# Patient Record
Sex: FEMALE | HISPANIC OR LATINO
[De-identification: names, ages, dates, MRNs, and addresses within clinical notes are randomized per-mention and may not be internally consistent; named-entity substitution may affect disease eponyms.]

---

## 2020-10-08 PROBLEM — Z00.00 ENCOUNTER FOR PREVENTIVE HEALTH EXAMINATION: Status: ACTIVE | Noted: 2020-10-08

## 2020-11-02 ENCOUNTER — APPOINTMENT (OUTPATIENT)
Dept: COLORECTAL SURGERY | Facility: CLINIC | Age: 60
End: 2020-11-02
Payer: MEDICARE

## 2020-11-02 VITALS
TEMPERATURE: 98.2 F | WEIGHT: 131 LBS | HEIGHT: 60 IN | BODY MASS INDEX: 25.72 KG/M2 | DIASTOLIC BLOOD PRESSURE: 80 MMHG | SYSTOLIC BLOOD PRESSURE: 114 MMHG

## 2020-11-02 DIAGNOSIS — Z87.898 PERSONAL HISTORY OF OTHER SPECIFIED CONDITIONS: ICD-10-CM

## 2020-11-02 DIAGNOSIS — Z80.3 FAMILY HISTORY OF MALIGNANT NEOPLASM OF BREAST: ICD-10-CM

## 2020-11-02 DIAGNOSIS — B20 HUMAN IMMUNODEFICIENCY VIRUS [HIV] DISEASE: ICD-10-CM

## 2020-11-02 DIAGNOSIS — E03.9 HYPOTHYROIDISM, UNSPECIFIED: ICD-10-CM

## 2020-11-02 DIAGNOSIS — M19.90 UNSPECIFIED OSTEOARTHRITIS, UNSPECIFIED SITE: ICD-10-CM

## 2020-11-02 DIAGNOSIS — Z78.9 OTHER SPECIFIED HEALTH STATUS: ICD-10-CM

## 2020-11-02 PROCEDURE — 46600 DIAGNOSTIC ANOSCOPY SPX: CPT

## 2020-11-02 PROCEDURE — 99072 ADDL SUPL MATRL&STAF TM PHE: CPT

## 2020-11-02 PROCEDURE — 99202 OFFICE O/P NEW SF 15 MIN: CPT | Mod: 25

## 2020-11-02 RX ORDER — CONJUGATED ESTROGENS AND MEDROXYPROGESTERONE ACETATE .625; 5 MG/1; MG/1
0.625-5 TABLET, SUGAR COATED ORAL
Qty: 28 | Refills: 0 | Status: ACTIVE | COMMUNITY
Start: 2020-10-12

## 2020-11-02 RX ORDER — NAPROXEN 500 MG/1
500 TABLET ORAL
Qty: 40 | Refills: 0 | Status: ACTIVE | COMMUNITY
Start: 2020-08-08

## 2020-11-02 RX ORDER — ELVITEGRAVIR, COBICISTAT, EMTRICITABINE, AND TENOFOVIR ALAFENAMIDE 150; 150; 200; 10 MG/1; MG/1; MG/1; MG/1
TABLET ORAL
Refills: 0 | Status: ACTIVE | COMMUNITY

## 2020-11-02 RX ORDER — LEVOTHYROXINE SODIUM 0.07 MG/1
75 TABLET ORAL
Refills: 0 | Status: ACTIVE | COMMUNITY

## 2020-11-02 NOTE — PHYSICAL EXAM
[Excoriation] : no perianal excoriation [Wart] : no warts [Normal] : was normal [None] : there was no rectal mass  [de-identified] : Anal pap performed [FreeTextEntry1] : A lighted anoscope was passed into the anal canal and the entire anal mucosal surface was inspected..  The findings revealed moderate internal hemorrhoids. No masses or lesions were identified.\par \par

## 2020-11-02 NOTE — HISTORY OF PRESENT ILLNESS
[FreeTextEntry1] : 61 yo F presents for evaluation of abnormal anal pap, referred by PCP Dr. Surendra Pichardo\par \par Atypical anal pap 7/22/20, cervical pap negative, (+) HPV HR non 16/18 \par Noticed labial warts a few months ago when she shaved and cut the area. Otherwise denies irritation. Has not see GYN or DERM\par Denies anorectal pain, irritation\par BH: once daily w/ straining and occasional scant BRB noted on TP\par Reports adequate dietary fiber and water intake\par Hx of taking stool softener w/o improvement. Denies fiber supplement use\par \par PMH HIV, diagnosed approx 5 years ago (exposed by prior partner), on Genvoya, VL undetectable, CD4 524 (7/2020)\par EGD/Colonoscopy within the last 10 years, normal as per patient\par Denies ASA/NSAID use

## 2020-11-04 LAB — ANAL PAP CYTOLOGY: NORMAL

## 2021-02-12 ENCOUNTER — APPOINTMENT (OUTPATIENT)
Dept: COLORECTAL SURGERY | Facility: CLINIC | Age: 61
End: 2021-02-12
Payer: MEDICARE

## 2021-02-12 VITALS
WEIGHT: 133 LBS | TEMPERATURE: 96.3 F | HEART RATE: 84 BPM | BODY MASS INDEX: 26.11 KG/M2 | HEIGHT: 60 IN | SYSTOLIC BLOOD PRESSURE: 121 MMHG | DIASTOLIC BLOOD PRESSURE: 80 MMHG

## 2021-02-12 DIAGNOSIS — R85.619 UNSPECIFIED ABNORMAL CYTOLOGICAL FINDINGS IN SPECIMENS FROM ANUS: ICD-10-CM

## 2021-02-12 PROCEDURE — 99214 OFFICE O/P EST MOD 30 MIN: CPT | Mod: 25

## 2021-02-12 PROCEDURE — 46600 DIAGNOSTIC ANOSCOPY SPX: CPT

## 2021-02-12 PROCEDURE — 99072 ADDL SUPL MATRL&STAF TM PHE: CPT

## 2021-02-12 NOTE — ASSESSMENT
[FreeTextEntry1] : I have reviewed with the patient the clinical and natural history of human papilloma virus and its relationship to the anus. The risks and associated consequences including sexual transmission, anal warts, anal dysplasia, and the risk of anal cancer have been outlined. The need for long-term surveillance and followup has been detailed. The treatment options including high resolution anoscopy and its associated risk of recurrence and post procedure stricture and pain compared to continued close surveillance and monitoring were reviewed. 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  may require HRA.\par

## 2021-02-12 NOTE — PHYSICAL EXAM
[Excoriation] : no perianal excoriation [Wart] : no warts [Normal] : was normal [None] : there was no rectal mass  [de-identified] : Anal pap performed [FreeTextEntry1] : A lighted anoscope was passed into the anal canal and the entire anal mucosal surface was inspected..  The findings revealed moderate internal hemorrhoids. No masses or lesions were identified.\par \par

## 2021-02-12 NOTE — HISTORY OF PRESENT ILLNESS
[FreeTextEntry1] : 59 yo F presents for f/u anal dysplasia\par Last seen in the office on 11/2/20. Moderate internal hemorrhoids noted, exam otherwise normal.  Anal pap smear was LGSIL cannot r/o HGSIL\par Last cervical pap smear completed 7/2020 negative. H/o labia warts \par Denies rectal pain or bleeding\par \par BH: once daily w/ some straining\par Reports adequate dietary fiber and trying to increase water intake\par Takes Metamucil occasionally\par hx of anal receptive sex many years ago\par Has not received Gardasil vaccine\par HIV (+) on Genvoya. CD4-7/2020 , VL- undetectable (7/2020), has f/u w/ PCP next week\par Last colonoscopy completed within the last 10 years, no abnormal findings per patient\par Denies ASA/NSAIDs last 7 days

## 2021-02-17 ENCOUNTER — NON-APPOINTMENT (OUTPATIENT)
Age: 61
End: 2021-02-17

## 2021-02-18 LAB — ANAL PAP CYTOLOGY: NORMAL
